# Patient Record
Sex: FEMALE | Race: WHITE | NOT HISPANIC OR LATINO | Employment: OTHER | URBAN - METROPOLITAN AREA
[De-identification: names, ages, dates, MRNs, and addresses within clinical notes are randomized per-mention and may not be internally consistent; named-entity substitution may affect disease eponyms.]

---

## 2017-09-14 NOTE — PROCEDURE NOTE: CLINICAL
PROCEDURE NOTE: Punctal Plugs, Temporary Prior to treatment, the risks/benefits/alternatives were discussed. The patient wished to proceed with procedure. Temporary collagen plugs were inserted. Patient tolerated procedure well. There were no complications. Post procedure instructions given. Queen Saravanan

## 2022-05-02 ENCOUNTER — COMPREHENSIVE EXAM (OUTPATIENT)
Dept: URBAN - METROPOLITAN AREA CLINIC 35 | Facility: CLINIC | Age: 62
End: 2022-05-02

## 2022-05-02 DIAGNOSIS — Z98.890: ICD-10-CM

## 2022-05-02 DIAGNOSIS — H52.7: ICD-10-CM

## 2022-05-02 DIAGNOSIS — H25.13: ICD-10-CM

## 2022-05-02 DIAGNOSIS — H43.391: ICD-10-CM

## 2022-05-02 PROCEDURE — 92014 COMPRE OPH EXAM EST PT 1/>: CPT

## 2022-05-02 PROCEDURE — 92015 DETERMINE REFRACTIVE STATE: CPT

## 2022-05-02 ASSESSMENT — VISUAL ACUITY
OS_CC: J2
OS_CC: 20/20-
OD_CC: 20/20
OD_CC: J1

## 2022-05-02 ASSESSMENT — TONOMETRY
OD_IOP_MMHG: 14
OS_IOP_MMHG: 14

## 2023-07-07 ENCOUNTER — COMPREHENSIVE EXAM (OUTPATIENT)
Dept: URBAN - METROPOLITAN AREA CLINIC 35 | Facility: CLINIC | Age: 63
End: 2023-07-07

## 2023-07-07 DIAGNOSIS — H52.7: ICD-10-CM

## 2023-07-07 DIAGNOSIS — H25.13: ICD-10-CM

## 2023-07-07 DIAGNOSIS — D31.31: ICD-10-CM

## 2023-07-07 DIAGNOSIS — H43.391: ICD-10-CM

## 2023-07-07 PROCEDURE — 92014 COMPRE OPH EXAM EST PT 1/>: CPT

## 2023-07-07 PROCEDURE — 92015 DETERMINE REFRACTIVE STATE: CPT

## 2023-07-07 ASSESSMENT — VISUAL ACUITY
OD_CC: 20/20
OD_SC: J10
OD_SC: 20/50
OS_CC: 20/25
OS_CC: J1+
OD_CC: J1+
OS_SC: 20/60-1
OS_SC: <J12

## 2023-07-07 ASSESSMENT — TONOMETRY
OD_IOP_MMHG: 10
OS_IOP_MMHG: 10

## 2024-07-03 ENCOUNTER — COMPREHENSIVE EXAM (OUTPATIENT)
Dept: URBAN - METROPOLITAN AREA CLINIC 35 | Facility: CLINIC | Age: 64
End: 2024-07-03

## 2024-07-03 DIAGNOSIS — H25.13: ICD-10-CM

## 2024-07-03 DIAGNOSIS — H52.7: ICD-10-CM

## 2024-07-03 DIAGNOSIS — D31.31: ICD-10-CM

## 2024-07-03 DIAGNOSIS — H43.391: ICD-10-CM

## 2024-07-03 PROCEDURE — 92014 COMPRE OPH EXAM EST PT 1/>: CPT

## 2024-07-03 PROCEDURE — 92015 DETERMINE REFRACTIVE STATE: CPT

## 2024-07-03 ASSESSMENT — TONOMETRY
OD_IOP_MMHG: 12
OS_IOP_MMHG: 10

## 2024-07-03 ASSESSMENT — VISUAL ACUITY
OU_CC: 20/20-1
OD_CC: J1
OS_CC: J1
OU_CC: J1
OS_CC: 20/30 FUZZY
OD_CC: 20/20

## 2025-07-08 ENCOUNTER — COMPREHENSIVE EXAM (OUTPATIENT)
Age: 65
End: 2025-07-08

## 2025-07-08 DIAGNOSIS — D31.31: ICD-10-CM

## 2025-07-08 DIAGNOSIS — H25.13: ICD-10-CM

## 2025-07-08 DIAGNOSIS — H52.7: ICD-10-CM

## 2025-07-08 DIAGNOSIS — H43.393: ICD-10-CM

## 2025-07-08 PROCEDURE — 92014 COMPRE OPH EXAM EST PT 1/>: CPT

## 2025-07-08 PROCEDURE — 92015 DETERMINE REFRACTIVE STATE: CPT
